# Patient Record
Sex: FEMALE | Race: WHITE | NOT HISPANIC OR LATINO | Employment: OTHER | URBAN - METROPOLITAN AREA
[De-identification: names, ages, dates, MRNs, and addresses within clinical notes are randomized per-mention and may not be internally consistent; named-entity substitution may affect disease eponyms.]

---

## 2018-01-02 ENCOUNTER — APPOINTMENT (OUTPATIENT)
Dept: WOMENS IMAGING | Facility: HOSPITAL | Age: 68
End: 2018-01-02

## 2018-01-02 PROCEDURE — 77067 SCR MAMMO BI INCL CAD: CPT | Performed by: RADIOLOGY

## 2019-01-03 ENCOUNTER — APPOINTMENT (OUTPATIENT)
Dept: WOMENS IMAGING | Facility: HOSPITAL | Age: 69
End: 2019-01-03

## 2019-01-03 PROCEDURE — 77067 SCR MAMMO BI INCL CAD: CPT | Performed by: RADIOLOGY

## 2019-01-03 PROCEDURE — 77063 BREAST TOMOSYNTHESIS BI: CPT | Performed by: RADIOLOGY

## 2019-05-02 ENCOUNTER — TRANSCRIBE ORDERS (OUTPATIENT)
Dept: PHYSICAL THERAPY | Facility: HOSPITAL | Age: 69
End: 2019-05-02

## 2019-05-02 DIAGNOSIS — M75.42 SUBACROMIAL IMPINGEMENT OF LEFT SHOULDER: Primary | ICD-10-CM

## 2019-05-14 ENCOUNTER — PREP FOR SURGERY (OUTPATIENT)
Dept: OTHER | Facility: HOSPITAL | Age: 69
End: 2019-05-14

## 2019-05-14 DIAGNOSIS — Z12.11 SCREEN FOR COLON CANCER: ICD-10-CM

## 2019-05-14 DIAGNOSIS — E10.9 TYPE 1 DIABETES MELLITUS WITHOUT COMPLICATION (HCC): Primary | ICD-10-CM

## 2019-05-21 ENCOUNTER — APPOINTMENT (OUTPATIENT)
Dept: PHYSICAL THERAPY | Facility: HOSPITAL | Age: 69
End: 2019-05-21

## 2019-05-22 ENCOUNTER — HOSPITAL ENCOUNTER (OUTPATIENT)
Dept: PHYSICAL THERAPY | Facility: HOSPITAL | Age: 69
Setting detail: THERAPIES SERIES
Discharge: HOME | End: 2019-05-22

## 2019-05-22 DIAGNOSIS — M75.42 SUBACROMIAL IMPINGEMENT OF LEFT SHOULDER: ICD-10-CM

## 2019-05-22 PROCEDURE — 97110 THERAPEUTIC EXERCISES: CPT | Performed by: PHYSICAL THERAPIST

## 2019-05-22 PROCEDURE — 97161 PT EVAL LOW COMPLEX 20 MIN: CPT | Performed by: PHYSICAL THERAPIST

## 2019-05-23 NOTE — THERAPY EVALUATION
Outpatient Physical Therapy Ortho Initial Evaluation  [facility]     Patient Name: Eveline Fierro  : 1950  MRN: [id number]  Today's Date: 2019      Visit Date: 2019    There is no problem list on file for this patient.       No past medical history on file.     No past surgical history on file.    Visit Dx:     ICD-10-CM ICD-9-CM   1. Subacromial impingement of left shoulder M75.42 726.19         Patient History     Row Name 19 0900             History    Chief Complaint  Difficulty with daily activities;Joint stiffness;Joint swelling;Muscle tenderness;Muscle weakness  -DH      Date Current Problem(s) Began  19  -DH      Brief Description of Current Complaint  pt hs hx of dm/ (R) UE domininat.  had injection (L) shoulder with benefit.  notes diffiuculty with reaching/ lifting tasks.  70 y/o female presenting to PT with hx of (L) shoulder pain  -DH         Pain     Pain Location  -- (L) shoulder pain  -DH      Pain at Present  3  -DH      Is your sleep disturbed?  No  -DH      Difficulties at work?  y  -DH      Difficulties with ADL's?  y  -DH      Difficulties with recreational activities?  y  -DH         Daily Activities    Pt Participated in POC and Goals  Yes  -DH         Safety    Are you being hurt, hit, or frightened by anyone at home or in your life?  No  -DH      Are you being neglected by a caregiver  No  -DH        User Key  (r) = Recorded By, (t) = Taken By, (c) = Cosigned By    Initials Name Provider Type     Juan Carlos Ocampo, PT Physical Therapist          PT Ortho     Row Name 19 0900       Subjective Comments    Subjective Comments  (L) shoulder pain  -DH       Special Tests/Palpation    Special Tests/Palpation  -- ss/ bicipital groove  -DH       General ROM    GENERAL ROM COMMENTS  (L) shoulder AROM 150/ ab 135/ IR 60  -DH       MMT (Manual Muscle Testing)    General MMT Comments  (L) ER 4-/ scaption 4-/5, ab 4-/5  -DH       Sensation    Additional  Comments  (+) hk/ (+) speed (-) obriend  -      User Key  (r) = Recorded By, (t) = Taken By, (c) = Cosigned By    Initials Name Provider Type     Juan Carlos Ocampo, PT Physical Therapist                            PT OP Goals     Row Name 05/23/19 1200          PT Short Term Goals    STG Date to Achieve  06/05/19  -     STG 1  pt to be educated/ independent with initial HEP specific to AAROm/ precautions-restrictions. 2 wks.   -     STG 2  increase (L) shoulder AAROM to 170 degrees in flexion/ abduction to allow for increased ease with dressing/ grooming activities. 2 wks.   -        Long Term Goals    LTG Date to Achieve  06/22/19  -     LTG 1  increased (L) shoulder AROM 85% of (R) to allow for increased ease with reaching/ lifting/ hold ing objeects. 4 wks.   -     LTG 2  increase (L) RC-scapular stabilization MMT at 90 degrees 4/5 to allow for increased ease with reaching/ lifting/ holding objects without exacerbation of pain greater than 3/ 10. 4 wks.   -     LTG 3  pt to report > 40% improvemend on functional outcome score. 4 wks.   -        Time Calculation    PT Goal Re-Cert Due Date  06/22/19  -       User Key  (r) = Recorded By, (t) = Taken By, (c) = Cosigned By    Initials Name Provider Type    Juan Carlos Knox, YVONNE Physical Therapist                Exercises     Row Name 05/23/19 0900             Subjective Comments    Subjective Comments  (L) shoulder pain  -         Total Minutes    93840 - PT Therapeutic Exercise Minutes  20  -DH         Exercise 1    Exercise Name 1  see flow sheet  -        User Key  (r) = Recorded By, (t) = Taken By, (c) = Cosigned By    Initials Name Provider Type    Juan Carlos Knox, PT Physical Therapist             assessment:     Eveline Fierro is a 69 y.o. year-old female referred to physical therapy for (L) shoulder internal impingement. She presents with a stable clinical presentation.  She has DM as a comorbidities  and no personal factors  that  may affect her progress in the plan of care.  Pt reports decrease tolerance with reaching / lifting/ holding objects with (R) UE secondary to 4/ 10 (L) shoulder pain. Signs and symptoms are consistent with physical therapy diagnosis of (L) shoulder internal impingement.           Outcome Measure Options: (38% dash)         Time Calculation:     Start Time: 1515  Stop Time: 1600  Time Calculation (min): 45 min     Therapy Charges for Today     Code Description Service Date Service Provider Modifiers Qty    82690342397 HC PT THER PROC EA 15 MIN 5/22/2019 Juan Carlos Ocampo, PT GP 1    83579470924 HC PT EVAL LOW COMPLEXITY 2 5/22/2019 Juan Carlos Ocampo, PT GP 1          PT G-Codes  Outcome Measure Options: (38% dash)         Juan Carlos Ocampo, PT  5/23/2019

## 2019-05-28 ENCOUNTER — HOSPITAL ENCOUNTER (OUTPATIENT)
Dept: PHYSICAL THERAPY | Facility: HOSPITAL | Age: 69
Setting detail: THERAPIES SERIES
Discharge: HOME | End: 2019-05-28

## 2019-05-28 DIAGNOSIS — M75.42 SUBACROMIAL IMPINGEMENT OF LEFT SHOULDER: Primary | ICD-10-CM

## 2019-05-28 PROBLEM — Z12.11 SCREEN FOR COLON CANCER: Status: ACTIVE | Noted: 2019-05-28

## 2019-05-28 PROCEDURE — 97110 THERAPEUTIC EXERCISES: CPT | Performed by: PHYSICAL THERAPIST

## 2019-05-29 NOTE — THERAPY TREATMENT NOTE
Outpatient Physical Therapy Ortho Treatment Note  [facility]     Patient Name: Eveline Fierro  : 1950  MRN: [id number]  Today's Date: 2019      Visit Date: 2019    Visit Dx:    ICD-10-CM ICD-9-CM   1. Subacromial impingement of left shoulder M75.42 726.19       Patient Active Problem List   Diagnosis   • Screen for colon cancer        No past medical history on file.     No past surgical history on file.                          Exercises     Row Name 19 0700             Subjective Comments    Subjective Comments  pulleys are helping me  -DH         Total Minutes    22066 - PT Therapeutic Exercise Minutes  45  -DH         Exercise 1    Exercise Name 1  see flow sheet  -        User Key  (r) = Recorded By, (t) = Taken By, (c) = Cosigned By    Initials Name Provider Type     Juan Carlos Ocampo, PT Physical Therapist       assessment:           progressed with shoulder PROM/ AAROM/ AROM per tolerance.  implemented mild rc-scapular stabilization per tolerance.   no pain with PRE;  improved IR to L3 region                            Time Calculation:   Start Time: 1345  Stop Time: 1430  Time Calculation (min): 45 min  Therapy Charges for Today     Code Description Service Date Service Provider Modifiers Qty    94224901469 HC PT THER PROC EA 15 MIN 2019 Juan Carlos Ocampo PT GP 3                    Juan Carlos Ocampo PT  2019

## 2019-06-04 ENCOUNTER — HOSPITAL ENCOUNTER (OUTPATIENT)
Dept: PHYSICAL THERAPY | Facility: HOSPITAL | Age: 69
Setting detail: THERAPIES SERIES
Discharge: HOME | End: 2019-06-04

## 2019-06-04 DIAGNOSIS — M75.42 SUBACROMIAL IMPINGEMENT OF LEFT SHOULDER: Primary | ICD-10-CM

## 2019-06-04 PROCEDURE — 97110 THERAPEUTIC EXERCISES: CPT | Performed by: PHYSICAL THERAPIST

## 2019-06-05 NOTE — THERAPY TREATMENT NOTE
Outpatient Physical Therapy Ortho Treatment Note  [facility]     Patient Name: Eveline Fierro  : 1950  MRN: [id number]  Today's Date: 2019      Visit Date: 2019    Visit Dx:    ICD-10-CM ICD-9-CM   1. Subacromial impingement of left shoulder M75.42 726.19       Patient Active Problem List   Diagnosis   • Screen for colon cancer        No past medical history on file.     No past surgical history on file.                          Exercises     Row Name 19 1200             Subjective Comments    Subjective Comments  IR is better  -         Total Minutes    15191 - PT Therapeutic Exercise Minutes  45  -         Exercise 1    Exercise Name 1  PT empahsis on shoulder PROM/ AAROM;  progressed with RC-scapular stabilization  -        User Key  (r) = Recorded By, (t) = Taken By, (c) = Cosigned By    Initials Name Provider Type     Juan Carlos Ocampo PT Physical Therapist              assessment:        progressed with shoulder AAROM/ AROM; improved IR to bra strap.   progressed with RC-scapular stabilization to fatigue without symptoms of pain.  encouraged with progress.                        Time Calculation:   Start Time: 1345  Stop Time: 1430  Time Calculation (min): 45 min  Therapy Charges for Today     Code Description Service Date Service Provider Modifiers Qty    98450225358 HC PT THER PROC EA 15 MIN 2019 Juan Carlos Ocampo PT GP 3                    Juan Carlos Ocampo PT  2019

## 2019-06-11 ENCOUNTER — HOSPITAL ENCOUNTER (OUTPATIENT)
Dept: PHYSICAL THERAPY | Facility: HOSPITAL | Age: 69
Setting detail: THERAPIES SERIES
Discharge: HOME | End: 2019-06-11

## 2019-06-11 DIAGNOSIS — M75.42 SUBACROMIAL IMPINGEMENT OF LEFT SHOULDER: Primary | ICD-10-CM

## 2019-06-11 PROCEDURE — 97110 THERAPEUTIC EXERCISES: CPT | Performed by: PHYSICAL THERAPIST

## 2019-06-11 NOTE — THERAPY TREATMENT NOTE
Outpatient Physical Therapy Ortho Treatment Note  [facility]     Patient Name: Eveline Fierro  : 1950  MRN: [id number]  Today's Date: 2019      Visit Date: 2019    Visit Dx:    ICD-10-CM ICD-9-CM   1. Subacromial impingement of left shoulder M75.42 726.19       Patient Active Problem List   Diagnosis   • Screen for colon cancer        No past medical history on file.     No past surgical history on file.                    PT Assessment/Plan     Row Name 19 1720          PT Assessment    Assessment Comments  Pt is fairly independent with current program and did not require much assist with program.  She was frustrated at change in schedule but worked hard with current program and new exercises.   -DM       User Key  (r) = Recorded By, (t) = Taken By, (c) = Cosigned By    Initials Name Provider Type    Michelel Cameron, PT Physical Therapist            Exercises     Row Name 19 1700             Subjective Comments    Subjective Comments  Working hard on all exercises and feels she is pretty independent with most exercises.    -DM         Subjective Pain    Able to rate subjective pain?  yes  -DM      Pre-Treatment Pain Level  1  -DM         Total Minutes    56587 - PT Therapeutic Exercise Minutes  20  -DM         Exercise 1    Exercise Name 1  Bent over row with 5#  -DM      Cueing 1  Verbal;Tactile  -DM      Reps 1  3x10  -DM      Additional Comments  VC for scapular retraction  -DM         Exercise 2    Exercise Name 2  Elbow lift  -DM      Cueing 2  Verbal;Tactile  -DM      Reps 2  25  -DM      Additional Comments  VC/TC for scapular retraction and depressionn  -DM         Exercise 3    Exercise Name 3  Scapular clock with purple band around back pushing to 3,6,7,9,  -DM      Cueing 3  Verbal;Tactile  -DM      Reps 3  2x 10  -DM         Exercise 4    Exercise Name 4  Standing row with purple band  -DM      Cueing 4  Verbal;Tactile;Demo  -DM      Reps 4  2x10  -DM       Additional Comments  VC/TC for scapular retraction and depressionn  -DM         Exercise 5    Exercise Name 5  Standing UE extension +retraction with purple band  -DM      Cueing 5  Verbal;Tactile;Demo  -DM      Reps 5  2x10  -DM      Additional Comments  VC/TC for scapular retraction and depressionn  -DM         Exercise 6    Exercise Name 6  Educated patient on impingement and impact of posture and mechanics to avoid impingement of shoulder.   -DM        User Key  (r) = Recorded By, (t) = Taken By, (c) = Cosigned By    Initials Name Provider Type    DM Michelle Causey, PT Physical Therapist                                          Time Calculation:   Start Time: 1440  Stop Time: 1505  Time Calculation (min): 25 min  Therapy Charges for Today     Code Description Service Date Service Provider Modifiers Qty    82012810743  PT THER PROC EA 15 MIN 6/11/2019 Michelle Causey, PT GP 1                    Michelle Causey, PT  6/11/2019

## 2019-06-18 ENCOUNTER — HOSPITAL ENCOUNTER (OUTPATIENT)
Dept: PHYSICAL THERAPY | Facility: HOSPITAL | Age: 69
Setting detail: THERAPIES SERIES
Discharge: HOME | End: 2019-06-18

## 2019-06-18 DIAGNOSIS — M75.42 SUBACROMIAL IMPINGEMENT OF LEFT SHOULDER: Primary | ICD-10-CM

## 2019-06-18 PROCEDURE — 97110 THERAPEUTIC EXERCISES: CPT | Performed by: PHYSICAL THERAPIST

## 2019-07-02 ENCOUNTER — HOSPITAL ENCOUNTER (OUTPATIENT)
Dept: PHYSICAL THERAPY | Facility: HOSPITAL | Age: 69
Setting detail: THERAPIES SERIES
Discharge: HOME | End: 2019-07-02

## 2019-07-02 DIAGNOSIS — M75.42 SUBACROMIAL IMPINGEMENT OF LEFT SHOULDER: Primary | ICD-10-CM

## 2019-07-02 PROCEDURE — 97110 THERAPEUTIC EXERCISES: CPT | Performed by: PHYSICAL THERAPIST

## 2019-07-02 NOTE — THERAPY TREATMENT NOTE
Outpatient Physical Therapy Ortho Treatment Note  [facility]     Patient Name: Eveline Fierro  : 1950  MRN: [id number]  Today's Date: 2019      Visit Date: 2019    Visit Dx:    ICD-10-CM ICD-9-CM   1. Subacromial impingement of left shoulder M75.42 726.19       Patient Active Problem List   Diagnosis   • Screen for colon cancer        No past medical history on file.     No past surgical history on file.                    PT Assessment/Plan     Row Name 19 1428          PT Assessment    Assessment Comments  progressed IR AAROM/ AROM;  implemented rc-scapular stabilization below 90 degrees;  no pain with pre;  imrpved IR to T8.   -DH     Please refer to paper survey for additional self-reported information  Yes  -DH     Rehab Potential  Good  -DH       User Key  (r) = Recorded By, (t) = Taken By, (c) = Cosigned By    Initials Name Provider Type     Juan Carlos Ocampo, PT Physical Therapist            Exercises     Row Name 19 1400             Subjective Comments    Subjective Comments  shoulder 70% improved; no pain with reaching/ discomfort with IR  -DH         Total Minutes    73663 - PT Therapeutic Exercise Minutes  40  -DH         Exercise 1    Exercise Name 1  reviewed progressed with IR AAROM/ AROM; rc scapular stabilization  -DH        User Key  (r) = Recorded By, (t) = Taken By, (c) = Cosigned By    Initials Name Provider Type    Juan Carlos Knox, PT Physical Therapist                                          Time Calculation:   Start Time: 1345  Stop Time: 1425  Time Calculation (min): 40 min  Therapy Charges for Today     Code Description Service Date Service Provider Modifiers Qty    28754517591  PT THER PROC EA 15 MIN 2019 Juan Carlos Ocampo, PT GP 3                    Juan Carlos Ocampo, PT  2019

## 2019-07-09 ENCOUNTER — HOSPITAL ENCOUNTER (OUTPATIENT)
Dept: PHYSICAL THERAPY | Facility: HOSPITAL | Age: 69
Setting detail: THERAPIES SERIES
Discharge: HOME | End: 2019-07-09

## 2019-07-09 DIAGNOSIS — M75.42 SUBACROMIAL IMPINGEMENT OF LEFT SHOULDER: Primary | ICD-10-CM

## 2019-07-09 PROCEDURE — 97110 THERAPEUTIC EXERCISES: CPT | Performed by: PHYSICAL THERAPIST

## 2019-07-10 NOTE — THERAPY TREATMENT NOTE
Outpatient Physical Therapy Ortho Treatment Note  [facility]     Patient Name: Eveline Fierro  : 1950  MRN: [id number]  Today's Date: 7/10/2019      Visit Date: 2019    Visit Dx:    ICD-10-CM ICD-9-CM   1. Subacromial impingement of left shoulder M75.42 726.19       Patient Active Problem List   Diagnosis   • Screen for colon cancer        No past medical history on file.     No past surgical history on file.                          Exercises     Row Name 07/10/19 0800             Subjective Comments    Subjective Comments  shoulder is doing well.  mild stiffness with IR  -DH         Total Minutes    04040 - PT Therapeutic Exercise Minutes  45  -         Exercise 1    Exercise Name 1  reviewed HEP/ mild cuing/ issued progressed HEP  -        User Key  (r) = Recorded By, (t) = Taken By, (c) = Cosigned By    Initials Name Provider Type    Juan Carlos Knox, PT Physical Therapist             pt has met all goals. demonstrates full shoulder AROM throughout;  mild discomfort with IR 85 degrees; increased RC-scapular stabilization 4+/5;  independent with progressed HEP.   appropriate for discharge at this time. pt agrees with POC and is satisfied with all intervention provided.            PT OP Goals     Row Name 07/10/19 0800          PT Short Term Goals    STG Date to Achieve  19  -     STG 1  pt to be educated/ independent with initial HEP specific to AAROm/ precautions-restrictions. 2 wks.   -     STG 1 Progress  Met  -     STG 2  increase (L) shoulder AAROM to 170 degrees in flexion/ abduction to allow for increased ease with dressing/ grooming activities. 2 wks.   -     STG 2 Progress  Met  [facility]        Long Term Goals    LTG Date to Achieve  19  -     LTG 1  increased (L) shoulder AROM 85% of (R) to allow for increased ease with reaching/ lifting/ hold ing objeects. 4 wks.   -     LTG 1 Progress  Met  [facility]     LTG 2  increase (L) RC-scapular stabilization MMT at 90  degrees 4/5 to allow for increased ease with reaching/ lifting/ holding objects without exacerbation of pain greater than 3/ 10. 4 wks.   -     LTG 2 Progress  Met  -       User Key  (r) = Recorded By, (t) = Taken By, (c) = Cosigned By    Initials Name Provider Type     Juan Carlos Ocampo, PT Physical Therapist                         Time Calculation:   Start Time: 1345  Stop Time: 1430  Time Calculation (min): 45 min  Therapy Charges for Today     Code Description Service Date Service Provider Modifiers Qty    78557742913  PT THER PROC EA 15 MIN 7/9/2019 Juan Carlos Ocampo, PT GP 3                    Juan Carlos Ocampo, PT  7/10/2019

## 2019-07-10 NOTE — THERAPY DISCHARGE NOTE
Outpatient Physical Therapy Ortho Progress Note/Discharge Summary  [facility]     Patient Name: Eveline Fierro  : 1950  MRN: [id number]  Today's Date: 7/10/2019      Visit Date: 2019    Visit Dx:    ICD-10-CM ICD-9-CM   1. Subacromial impingement of left shoulder M75.42 726.19       Patient Active Problem List   Diagnosis   • Screen for colon cancer        No past medical history on file.     No past surgical history on file.                            Exercises     Row Name 07/10/19 0800             Subjective Comments    Subjective Comments  shoulder is doing well.  mild stiffness with IR  -DH         Total Minutes    60826 - PT Therapeutic Exercise Minutes  45  -         Exercise 1    Exercise Name 1  reviewed HEP/ mild cuing/ issued progressed HEP  -        User Key  (r) = Recorded By, (t) = Taken By, (c) = Cosigned By    Initials Name Provider Type     Juan Carlos Ocampo, PT Physical Therapist                         PT OP Goals     Row Name 07/10/19 0800          PT Short Term Goals    STG Date to Achieve  19  -     STG 1  pt to be educated/ independent with initial HEP specific to AAROm/ precautions-restrictions. 2 wks.   -     STG 1 Progress  Met  -     STG 2  increase (L) shoulder AAROM to 170 degrees in flexion/ abduction to allow for increased ease with dressing/ grooming activities. 2 wks.   -     STG 2 Progress  Met  [facility]        Long Term Goals    LTG Date to Achieve  19  -     LTG 1  increased (L) shoulder AROM 85% of (R) to allow for increased ease with reaching/ lifting/ hold ing objeects. 4 wks.   -     LTG 1 Progress  Met  -     LTG 2  increase (L) RC-scapular stabilization MMT at 90 degrees 4/5 to allow for increased ease with reaching/ lifting/ holding objects without exacerbation of pain greater than 3/ 10. 4 wks.   -     LTG 2 Progress  Met  -       User Key  (r) = Recorded By, (t) = Taken By, (c) = Cosigned By    Initials Name Provider  Type    DH Juan Carlos Ocampo, PT Physical Therapist                         Time Calculation:   Start Time: 1345  Stop Time: 1430  Time Calculation (min): 45 min  Therapy Charges for Today     Code Description Service Date Service Provider Modifiers Qty    70481022952 HC PT THER PROC EA 15 MIN 7/9/2019 Juan Carlos Ocampo, PT GP 3                       Juan Carlos Ocampo, PT  7/10/2019

## 2019-07-29 ENCOUNTER — ANESTHESIA (OUTPATIENT)
Dept: GASTROENTEROLOGY | Facility: HOSPITAL | Age: 69
End: 2019-07-29

## 2019-07-29 ENCOUNTER — HOSPITAL ENCOUNTER (OUTPATIENT)
Facility: HOSPITAL | Age: 69
Setting detail: HOSPITAL OUTPATIENT SURGERY
Discharge: HOME | End: 2019-07-29
Attending: SURGERY | Admitting: SURGERY

## 2019-07-29 ENCOUNTER — ANESTHESIA EVENT (OUTPATIENT)
Dept: GASTROENTEROLOGY | Facility: HOSPITAL | Age: 69
End: 2019-07-29

## 2019-07-29 VITALS
OXYGEN SATURATION: 100 % | DIASTOLIC BLOOD PRESSURE: 63 MMHG | BODY MASS INDEX: 27.34 KG/M2 | SYSTOLIC BLOOD PRESSURE: 136 MMHG | RESPIRATION RATE: 16 BRPM | HEIGHT: 65 IN | TEMPERATURE: 98.1 F | HEART RATE: 45 BPM | WEIGHT: 164.1 LBS

## 2019-07-29 LAB — GLUCOSE BLDC GLUCOMTR-MCNC: 143 MG/DL (ref 70–130)

## 2019-07-29 PROCEDURE — G0121 COLON CA SCRN NOT HI RSK IND: HCPCS | Performed by: SURGERY

## 2019-07-29 PROCEDURE — 25010000002 PROPOFOL 10 MG/ML EMULSION: Performed by: ANESTHESIOLOGY

## 2019-07-29 PROCEDURE — 82962 GLUCOSE BLOOD TEST: CPT

## 2019-07-29 PROCEDURE — 25010000002 GLUCAGON (RDNA) PER 1 MG: Performed by: SURGERY

## 2019-07-29 RX ORDER — UBIDECARENONE 100 MG
100 CAPSULE ORAL DAILY
COMMUNITY

## 2019-07-29 RX ORDER — LIDOCAINE HYDROCHLORIDE 20 MG/ML
INJECTION, SOLUTION INFILTRATION; PERINEURAL AS NEEDED
Status: DISCONTINUED | OUTPATIENT
Start: 2019-07-29 | End: 2019-07-29 | Stop reason: SURG

## 2019-07-29 RX ORDER — PROPOFOL 10 MG/ML
VIAL (ML) INTRAVENOUS AS NEEDED
Status: DISCONTINUED | OUTPATIENT
Start: 2019-07-29 | End: 2019-07-29 | Stop reason: SURG

## 2019-07-29 RX ORDER — SODIUM CHLORIDE, SODIUM LACTATE, POTASSIUM CHLORIDE, CALCIUM CHLORIDE 600; 310; 30; 20 MG/100ML; MG/100ML; MG/100ML; MG/100ML
30 INJECTION, SOLUTION INTRAVENOUS CONTINUOUS PRN
Status: DISCONTINUED | OUTPATIENT
Start: 2019-07-29 | End: 2019-07-29 | Stop reason: HOSPADM

## 2019-07-29 RX ORDER — LOSARTAN POTASSIUM 25 MG/1
25 TABLET ORAL DAILY
COMMUNITY

## 2019-07-29 RX ORDER — LANOLIN ALCOHOL/MO/W.PET/CERES
1000 CREAM (GRAM) TOPICAL DAILY
COMMUNITY

## 2019-07-29 RX ORDER — PRAVASTATIN SODIUM 10 MG
10 TABLET ORAL DAILY
COMMUNITY

## 2019-07-29 RX ORDER — LEVOTHYROXINE SODIUM 88 UG/1
88 TABLET ORAL DAILY
COMMUNITY

## 2019-07-29 RX ORDER — INSULIN GLARGINE 100 [IU]/ML
17 INJECTION, SOLUTION SUBCUTANEOUS DAILY
COMMUNITY

## 2019-07-29 RX ADMIN — LIDOCAINE HYDROCHLORIDE 75 MG: 20 INJECTION, SOLUTION INFILTRATION; PERINEURAL at 09:22

## 2019-07-29 RX ADMIN — SODIUM CHLORIDE, POTASSIUM CHLORIDE, SODIUM LACTATE AND CALCIUM CHLORIDE 30 ML/HR: 600; 310; 30; 20 INJECTION, SOLUTION INTRAVENOUS at 08:19

## 2019-07-29 RX ADMIN — PROPOFOL 150 MG: 10 INJECTION, EMULSION INTRAVENOUS at 09:22

## 2019-07-29 NOTE — ANESTHESIA POSTPROCEDURE EVALUATION
"Patient: Eveline Fierro    Procedure Summary     Date:  07/29/19 Room / Location:  [facility] ENDOSCOPY 4 / [facility] ENDOSCOPY    Anesthesia Start:  0922 Anesthesia Stop:  0941    Procedure:  COLONOSCOPY TO CECUM (N/A ) Diagnosis:       Screen for colon cancer      (Screen for colon cancer [Z12.11])    Surgeon:  Carmela Ng MD Provider:  Celia Jones MD    Anesthesia Type:  MAC ASA Status:  2          Anesthesia Type: MAC  Last vitals  BP   131/65 (07/29/19 0941)   Temp   36.7 °C (98.1 °F) (07/29/19 0802)   Pulse   60 (07/29/19 0941)   Resp   16 (07/29/19 0941)     SpO2   100 % (07/29/19 0941)     Post Anesthesia Care and Evaluation    Patient location during evaluation: PACU  Patient participation: complete - patient participated  Level of consciousness: sleepy but conscious  Pain score: 0  Pain management: adequate  Airway patency: patent  Anesthetic complications: No anesthetic complications    Cardiovascular status: acceptable  Respiratory status: acceptable  Hydration status: acceptable    Comments: Blood pressure 131/65, pulse 60, temperature 36.7 °C (98.1 °F), temperature source Oral, resp. rate 16, height 164.6 cm (64.8\"), weight 74.4 kg (164 lb 1.6 oz), SpO2 100 %.    No anesthesia care post op    "

## 2019-07-29 NOTE — ANESTHESIA PREPROCEDURE EVALUATION
Anesthesia Evaluation     Patient summary reviewed and Nursing notes reviewed   history of anesthetic complications: PONV  NPO Solid Status: > 8 hours  NPO Liquid Status: > 4 hours           Airway   Dental      Pulmonary    Cardiovascular     (+) hypertension less than 2 medications,       Neuro/Psych  GI/Hepatic/Renal/Endo    (+)   diabetes mellitus,     Musculoskeletal     Abdominal    Substance History      OB/GYN          Other                        Anesthesia Plan    ASA 2     MAC     Anesthetic plan, all risks, benefits, and alternatives have been provided, discussed and informed consent has been obtained with: patient.

## 2020-01-20 ENCOUNTER — APPOINTMENT (OUTPATIENT)
Dept: WOMENS IMAGING | Facility: HOSPITAL | Age: 70
End: 2020-01-20

## 2020-01-20 PROCEDURE — 77067 SCR MAMMO BI INCL CAD: CPT | Performed by: RADIOLOGY

## 2020-01-20 PROCEDURE — 77063 BREAST TOMOSYNTHESIS BI: CPT | Performed by: RADIOLOGY

## 2021-06-08 ENCOUNTER — APPOINTMENT (OUTPATIENT)
Dept: WOMENS IMAGING | Facility: HOSPITAL | Age: 71
End: 2021-06-08

## 2021-06-08 PROCEDURE — 77067 SCR MAMMO BI INCL CAD: CPT | Performed by: RADIOLOGY

## 2021-06-08 PROCEDURE — 77063 BREAST TOMOSYNTHESIS BI: CPT | Performed by: RADIOLOGY

## 2022-06-10 ENCOUNTER — APPOINTMENT (OUTPATIENT)
Dept: WOMENS IMAGING | Facility: HOSPITAL | Age: 72
End: 2022-06-10

## 2022-06-10 PROCEDURE — 77067 SCR MAMMO BI INCL CAD: CPT | Performed by: RADIOLOGY

## 2022-06-10 PROCEDURE — 77063 BREAST TOMOSYNTHESIS BI: CPT | Performed by: RADIOLOGY

## 2022-08-02 ENCOUNTER — TRANSCRIBE ORDERS (OUTPATIENT)
Dept: ADMINISTRATIVE | Facility: HOSPITAL | Age: 72
End: 2022-08-02

## 2022-08-02 DIAGNOSIS — Z13.6 ENCOUNTER FOR SCREENING FOR VASCULAR DISEASE: Primary | ICD-10-CM

## 2022-12-02 ENCOUNTER — HOSPITAL ENCOUNTER (OUTPATIENT)
Dept: CARDIOLOGY | Facility: HOSPITAL | Age: 72
Discharge: HOME | End: 2022-12-02
Admitting: INTERNAL MEDICINE

## 2022-12-02 VITALS
HEIGHT: 63 IN | WEIGHT: 176 LBS | BODY MASS INDEX: 31.18 KG/M2 | HEART RATE: 52 BPM | DIASTOLIC BLOOD PRESSURE: 61 MMHG | SYSTOLIC BLOOD PRESSURE: 110 MMHG

## 2022-12-02 DIAGNOSIS — Z13.6 ENCOUNTER FOR SCREENING FOR VASCULAR DISEASE: ICD-10-CM

## 2022-12-02 LAB
BH CV ECHO MEAS - DIST AO DIAM: 1.49 CM
BH CV VAS BP LEFT ARM: NORMAL MMHG
BH CV VAS BP RIGHT ARM: NORMAL MMHG
BH CV XLRA MEAS - MID AO DIAM: 1.76 CM
BH CV XLRA MEAS - PAD LEFT ABI DP: 1.33
BH CV XLRA MEAS - PAD LEFT ABI PT: 1.37
BH CV XLRA MEAS - PAD LEFT ARM: 102 MMHG
BH CV XLRA MEAS - PAD LEFT LEG DP: 146 MMHG
BH CV XLRA MEAS - PAD LEFT LEG PT: 151 MMHG
BH CV XLRA MEAS - PAD RIGHT ABI DP: 1.36
BH CV XLRA MEAS - PAD RIGHT ABI PT: 1.38
BH CV XLRA MEAS - PAD RIGHT ARM: 110 MMHG
BH CV XLRA MEAS - PAD RIGHT LEG DP: 150 MMHG
BH CV XLRA MEAS - PAD RIGHT LEG PT: 152 MMHG
BH CV XLRA MEAS - PROX AO DIAM: 2.01 CM
BH CV XLRA MEAS LEFT ICA/CCA RATIO: 0.98
BH CV XLRA MEAS LEFT MID CCA PSV: NORMAL CM/SEC
BH CV XLRA MEAS LEFT MID ICA PSV: NORMAL CM/SEC
BH CV XLRA MEAS LEFT PROX ECA PSV: NORMAL CM/SEC
BH CV XLRA MEAS RIGHT ICA/CCA RATIO: 1.05
BH CV XLRA MEAS RIGHT MID CCA PSV: NORMAL CM/SEC
BH CV XLRA MEAS RIGHT MID ICA PSV: NORMAL CM/SEC
BH CV XLRA MEAS RIGHT PROX ECA PSV: NORMAL CM/SEC
MAXIMAL PREDICTED HEART RATE: 150 BPM
STRESS TARGET HR: 128 BPM

## 2022-12-02 PROCEDURE — 93799 UNLISTED CV SVC/PROCEDURE: CPT

## 2023-10-31 ENCOUNTER — LAB (OUTPATIENT)
Dept: LAB | Facility: HOSPITAL | Age: 73
End: 2023-10-31
Payer: MEDICARE

## 2023-10-31 ENCOUNTER — TRANSCRIBE ORDERS (OUTPATIENT)
Dept: CARDIOLOGY | Facility: HOSPITAL | Age: 73
End: 2023-10-31
Payer: MEDICARE

## 2023-10-31 ENCOUNTER — HOSPITAL ENCOUNTER (OUTPATIENT)
Dept: CARDIOLOGY | Facility: HOSPITAL | Age: 73
Discharge: HOME | End: 2023-10-31
Payer: MEDICARE

## 2023-10-31 ENCOUNTER — TRANSCRIBE ORDERS (OUTPATIENT)
Dept: LAB | Facility: HOSPITAL | Age: 73
End: 2023-10-31
Payer: MEDICARE

## 2023-10-31 DIAGNOSIS — N20.0 URIC ACID NEPHROLITHIASIS: ICD-10-CM

## 2023-10-31 DIAGNOSIS — Z01.811 PRE-OP CHEST EXAM: Primary | ICD-10-CM

## 2023-10-31 DIAGNOSIS — N20.0 URIC ACID NEPHROLITHIASIS: Primary | ICD-10-CM

## 2023-10-31 LAB
ANION GAP SERPL CALCULATED.3IONS-SCNC: 9 MMOL/L (ref 5–15)
BASOPHILS # BLD AUTO: 0.02 10*3/MM3 (ref 0–0.2)
BASOPHILS NFR BLD AUTO: 0.3 % (ref 0–1.5)
BUN SERPL-MCNC: 18 MG/DL (ref 8–23)
BUN/CREAT SERPL: 20.5 (ref 7–25)
CALCIUM SPEC-SCNC: 9.5 MG/DL (ref 8.6–10.5)
CHLORIDE SERPL-SCNC: 108 MMOL/L (ref 98–107)
CO2 SERPL-SCNC: 29 MMOL/L (ref 22–29)
CREAT SERPL-MCNC: 0.88 MG/DL (ref 0.57–1)
DEPRECATED RDW RBC AUTO: 41.2 FL (ref 37–54)
EGFRCR SERPLBLD CKD-EPI 2021: 70.4 ML/MIN/1.73
EOSINOPHIL # BLD AUTO: 0.13 10*3/MM3 (ref 0–0.4)
EOSINOPHIL NFR BLD AUTO: 1.8 % (ref 0.3–6.2)
ERYTHROCYTE [DISTWIDTH] IN BLOOD BY AUTOMATED COUNT: 12.8 % (ref 12.3–15.4)
GLUCOSE SERPL-MCNC: 56 MG/DL (ref 65–99)
HCT VFR BLD AUTO: 38.9 % (ref 34–46.6)
HGB BLD-MCNC: 13 G/DL (ref 12–15.9)
IMM GRANULOCYTES # BLD AUTO: 0.02 10*3/MM3 (ref 0–0.05)
IMM GRANULOCYTES NFR BLD AUTO: 0.3 % (ref 0–0.5)
LYMPHOCYTES # BLD AUTO: 1.87 10*3/MM3 (ref 0.7–3.1)
LYMPHOCYTES NFR BLD AUTO: 25.8 % (ref 19.6–45.3)
MCH RBC QN AUTO: 29.7 PG (ref 26.6–33)
MCHC RBC AUTO-ENTMCNC: 33.4 G/DL (ref 31.5–35.7)
MCV RBC AUTO: 88.8 FL (ref 79–97)
MONOCYTES # BLD AUTO: 0.51 10*3/MM3 (ref 0.1–0.9)
MONOCYTES NFR BLD AUTO: 7 % (ref 5–12)
NEUTROPHILS NFR BLD AUTO: 4.71 10*3/MM3 (ref 1.7–7)
NEUTROPHILS NFR BLD AUTO: 64.8 % (ref 42.7–76)
NRBC BLD AUTO-RTO: 0 /100 WBC (ref 0–0.2)
PLATELET # BLD AUTO: 222 10*3/MM3 (ref 140–450)
PMV BLD AUTO: 10.2 FL (ref 6–12)
POTASSIUM SERPL-SCNC: 4 MMOL/L (ref 3.5–5.2)
QT INTERVAL: 424 MS
QTC INTERVAL: 383 MS
RBC # BLD AUTO: 4.38 10*6/MM3 (ref 3.77–5.28)
SODIUM SERPL-SCNC: 146 MMOL/L (ref 136–145)
WBC NRBC COR # BLD: 7.26 10*3/MM3 (ref 3.4–10.8)

## 2023-10-31 PROCEDURE — 85025 COMPLETE CBC W/AUTO DIFF WBC: CPT

## 2023-10-31 PROCEDURE — 36415 COLL VENOUS BLD VENIPUNCTURE: CPT

## 2023-10-31 PROCEDURE — 93005 ELECTROCARDIOGRAM TRACING: CPT

## 2023-10-31 PROCEDURE — 80048 BASIC METABOLIC PNL TOTAL CA: CPT

## 2024-09-25 ENCOUNTER — TRANSCRIBE ORDERS (OUTPATIENT)
Dept: ADMINISTRATIVE | Facility: HOSPITAL | Age: 74
End: 2024-09-25
Payer: MEDICARE

## 2024-09-25 DIAGNOSIS — Z13.6 SCREENING FOR ISCHEMIC HEART DISEASE: Primary | ICD-10-CM

## 2024-10-07 ENCOUNTER — HOSPITAL ENCOUNTER (OUTPATIENT)
Dept: CT IMAGING | Facility: HOSPITAL | Age: 74
Discharge: HOME | End: 2024-10-07
Admitting: INTERNAL MEDICINE

## 2024-10-07 DIAGNOSIS — Z13.6 SCREENING FOR ISCHEMIC HEART DISEASE: ICD-10-CM

## 2024-10-07 PROCEDURE — 75571 CT HRT W/O DYE W/CA TEST: CPT

## 2025-02-10 ENCOUNTER — OFFICE VISIT (OUTPATIENT)
Dept: ENDOCRINOLOGY | Age: 75
End: 2025-02-10
Payer: MEDICARE

## 2025-02-10 VITALS
DIASTOLIC BLOOD PRESSURE: 72 MMHG | TEMPERATURE: 98.2 F | SYSTOLIC BLOOD PRESSURE: 124 MMHG | BODY MASS INDEX: 30.04 KG/M2 | OXYGEN SATURATION: 99 % | WEIGHT: 169.6 LBS | HEART RATE: 53 BPM

## 2025-02-10 DIAGNOSIS — Z79.4 TYPE 2 DIABETES MELLITUS WITHOUT COMPLICATION, WITH LONG-TERM CURRENT USE OF INSULIN: Primary | ICD-10-CM

## 2025-02-10 DIAGNOSIS — E11.9 TYPE 2 DIABETES MELLITUS WITHOUT COMPLICATION, WITH LONG-TERM CURRENT USE OF INSULIN: ICD-10-CM

## 2025-02-10 DIAGNOSIS — E11.9 TYPE 2 DIABETES MELLITUS WITHOUT COMPLICATION, WITH LONG-TERM CURRENT USE OF INSULIN: Primary | ICD-10-CM

## 2025-02-10 DIAGNOSIS — Z79.4 TYPE 2 DIABETES MELLITUS WITHOUT COMPLICATION, WITH LONG-TERM CURRENT USE OF INSULIN: ICD-10-CM

## 2025-02-10 PROCEDURE — 95251 CONT GLUC MNTR ANALYSIS I&R: CPT | Performed by: INTERNAL MEDICINE

## 2025-02-10 PROCEDURE — 99204 OFFICE O/P NEW MOD 45 MIN: CPT | Performed by: INTERNAL MEDICINE

## 2025-02-10 RX ORDER — ACYCLOVIR 400 MG/1
1 TABLET ORAL
Qty: 3 EACH | Refills: 2 | Status: SHIPPED | OUTPATIENT
Start: 2025-02-10

## 2025-02-10 RX ORDER — ACYCLOVIR 400 MG/1
TABLET ORAL
COMMUNITY
Start: 2025-01-21 | End: 2025-02-10 | Stop reason: SDUPTHER

## 2025-02-10 RX ORDER — PEN NEEDLE, DIABETIC 32GX 5/32"
NEEDLE, DISPOSABLE MISCELLANEOUS
COMMUNITY
Start: 2025-01-19

## 2025-02-10 RX ORDER — LANCETS
EACH MISCELLANEOUS
COMMUNITY
Start: 2025-01-29

## 2025-02-10 RX ORDER — BLOOD SUGAR DIAGNOSTIC
STRIP MISCELLANEOUS
COMMUNITY
Start: 2025-01-20

## 2025-02-10 NOTE — PROGRESS NOTES
Chief complaint   Chief Complaint   Patient presents with     Type 1 diabetes mellitus without complications          Subjective     History of Present Illness:          This is a 74 year old female I am seeing for type 2 DM   referred by PCP     other medical history is   1- HTN   2- HLD   3- Hypothyroidism   4- Other   Pt had T2DM for years now about 10 years   Current home medication for diabetes     Lantus, pen 15 units PM   Humalog 6 , 10, 12 units per day   Was on pills in the past     the A1c was 6.7 in 1-2025  Checks blood sugar at home using CGM   Checks blood sugar 1-4 times per day   SBMG: ave is 140 with 12 % low and 64 % in range   pt states that he is having better BG now , trying his best with dietary Compliance, in regards to Exercise,  on a daily basis and his Weight has been the same and there is few  Hypoglycemic episodes,mainly at night  there is no AM Headaches /Nightmares, no Polyuria / Polydipsia, and there os no Blurring of Vision  Last Dilated Pupil Exam, in 2023 , no DM in the eye   NoTingling / numbness in feet, No Dizziness / lightheadedness, No Falls  No Nausea, vomiting / Diarrhea  retired.       Latest Reference Range & Units 10/31/23 13:12   Sodium 136 - 145 mmol/L 146 (H)   Potassium 3.5 - 5.2 mmol/L 4.0   Chloride 98 - 107 mmol/L 108 (H)   CO2 22.0 - 29.0 mmol/L 29.0   Anion Gap 5.0 - 15.0 mmol/L 9.0   BUN 8 - 23 mg/dL 18   Creatinine 0.57 - 1.00 mg/dL 0.88   BUN/Creatinine Ratio 7.0 - 25.0  20.5   eGFR >60.0 mL/min/1.73 70.4   Glucose 65 - 99 mg/dL 56 (L)   Calcium 8.6 - 10.5 mg/dL 9.5   (H): Data is abnormally high  (L): Data is abnormally low  History reviewed. No pertinent family history.  Social History     Socioeconomic History    Marital status: Single   Tobacco Use    Smoking status: Never    Smokeless tobacco: Never   Substance and Sexual Activity    Alcohol use: Yes     Comment: monthly     Drug use: No    Sexual activity: Defer     Past Medical History:   Diagnosis Date     Diabetes mellitus     PONV (postoperative nausea and vomiting)      Past Surgical History:   Procedure Laterality Date    COLONOSCOPY N/A 7/29/2019    Procedure: COLONOSCOPY TO CECUM;  Surgeon: Carmela Ng MD;  Location: [facility] ENDOSCOPY;  Service: General    REPLACEMENT TOTAL KNEE BILATERAL  2010, 2016       Current Outpatient Medications:     Accu-Chek FastClix Lancets misc, USE AS DIRECTED TO TEST BLOOD SUGAR 3-4 TIMES DAILY, Disp: , Rfl:     BD Pen Needle Mirian 2nd Gen 32G X 4 MM misc, , Disp: , Rfl:     Cholecalciferol (VITAMIN D3) 5000 units capsule capsule, Take 2,000 Units by mouth Daily. 2000 IUs, Disp: , Rfl:     coenzyme Q10 100 MG capsule, Take 1 capsule by mouth Daily., Disp: , Rfl:     Continuous Glucose Sensor (Dexcom G7 Sensor) misc, Inject 1 Units under the skin into the appropriate area as directed Every 10 (Ten) Days., Disp: 3 each, Rfl: 2    Insulin Glargine (BASAGLAR KWIKPEN) 100 UNIT/ML injection pen, Inject 17 Units under the skin into the appropriate area as directed Daily., Disp: , Rfl:     insulin lispro (humaLOG) 100 UNIT/ML injection, Inject 17 Units under the skin into the appropriate area as directed 3 (Three) Times a Day Before Meals., Disp: , Rfl:     levothyroxine (SYNTHROID, LEVOTHROID) 88 MCG tablet, Take 1 tablet by mouth Daily., Disp: , Rfl:     losartan (COZAAR) 25 MG tablet, Take 1 tablet by mouth Daily., Disp: , Rfl:     OneTouch Verio test strip, USE AS DIRECTED TO TEST BLOOD SUGAR THREE TIMES DAILY TO FOUR TIMES DAILY, Disp: , Rfl:     pravastatin (PRAVACHOL) 10 MG tablet, Take 1 tablet by mouth Daily., Disp: , Rfl:     vitamin B-12 (CYANOCOBALAMIN) 1000 MCG tablet, Take 1 tablet by mouth Daily., Disp: , Rfl:   Patient has no known allergies.    Objective   Vitals:    02/10/25 0906   BP: 124/72   Pulse: 53   Temp: 98.2 °F (36.8 °C)   SpO2: 99%          Physical Exam  Neurological:      General: No focal deficit present.      Mental Status: She is alert and oriented to  person, place, and time.               Diagnoses and all orders for this visit:    1. Type 2 diabetes mellitus without complication, with long-term current use of insulin (Primary)  -     Basic Metabolic Panel  -     Cancel: ZNT8 Antibodies; Future  -     C-Peptide  -     Glutamic Acid Decarboxylase  -     ZNT8 Antibodies; Future  -     Continuous Glucose Sensor (Dexcom G7 Sensor) misc; Inject 1 Units under the skin into the appropriate area as directed Every 10 (Ten) Days.  Dispense: 3 each; Refill: 2         Assessment:     1- DM, Type 2  Good control but having too many low BG which is a High risk   labs on file   We discussed the medications  Hypoglycemia and its importance was reviewed   Labs where shown to the patient   2. Hypertension, on ARB   3. Hyperlipidemia, on a statin   4. Obesity, We discussed the importance of weight loss and the impact of losing 7 % Of the current body weight         Plan:    1. Diet, exercise and weight management  2. Consistent food intake to avoid low blood sugars  3. Home medication includes for diabetes     Change to Lantus, pen 22 units PM   Humalog 6 , units per meal + SSI   1/50 above   First goal it avoid the lows   I told her that she is most likely type 2 not 1 , will test her today     4. Consistent checking of blood sugars using CGM   5. I reviewed the last progress note  6. Annual eye exam  7. Return in 3 months   8. Send in readings in 4-6 weeks if running high , she will send me a massage   9. Rx sent to the pharmacy  10. Labs : A1c today       I discussed with the patient/legal representative the risks and the benefits associated with the medications.  The patient/legal representative has been given the opportunity to ask questions.  Alternatives to the proposed treatment (s) were discussed, including the likely results of no treatment.  The patient/legal representative wishes to proceed.       Lisa Gonzalez MD   02/10/25  09:30 EST

## 2025-02-11 ENCOUNTER — PATIENT ROUNDING (BHMG ONLY) (OUTPATIENT)
Dept: ENDOCRINOLOGY | Age: 75
End: 2025-02-11
Payer: MEDICARE

## 2025-02-22 LAB
BUN SERPL-MCNC: 23 MG/DL (ref 8–27)
BUN/CREAT SERPL: 26 (ref 12–28)
C PEPTIDE SERPL-MCNC: 0.2 NG/ML (ref 1.1–4.4)
CALCIUM SERPL-MCNC: 9.4 MG/DL (ref 8.7–10.3)
CHLORIDE SERPL-SCNC: 107 MMOL/L (ref 96–106)
CO2 SERPL-SCNC: 26 MMOL/L (ref 20–29)
CREAT SERPL-MCNC: 0.9 MG/DL (ref 0.57–1)
EGFRCR SERPLBLD CKD-EPI 2021: 68 ML/MIN/1.73
GAD65 AB SER IA-ACNC: <5 U/ML (ref 0–5)
GLUCOSE SERPL-MCNC: 119 MG/DL (ref 70–99)
POTASSIUM SERPL-SCNC: 4.6 MMOL/L (ref 3.5–5.2)
SODIUM SERPL-SCNC: 143 MMOL/L (ref 134–144)
ZNT8 AB SERPL IA-ACNC: <15 U/ML

## 2025-03-11 ENCOUNTER — TELEPHONE (OUTPATIENT)
Dept: ENDOCRINOLOGY | Age: 75
End: 2025-03-11
Payer: MEDICARE

## 2025-03-11 NOTE — TELEPHONE ENCOUNTER
Pt dexcom report under media tab.    Pt called asking for you to look at her report and see if you think any changes need to be made.

## 2025-04-10 ENCOUNTER — TREATMENT (OUTPATIENT)
Dept: ENDOCRINOLOGY | Age: 75
End: 2025-04-10
Payer: MEDICARE

## 2025-04-28 DIAGNOSIS — E11.9 TYPE 2 DIABETES MELLITUS WITHOUT COMPLICATION, WITH LONG-TERM CURRENT USE OF INSULIN: Primary | ICD-10-CM

## 2025-04-28 DIAGNOSIS — Z79.4 TYPE 2 DIABETES MELLITUS WITHOUT COMPLICATION, WITH LONG-TERM CURRENT USE OF INSULIN: Primary | ICD-10-CM

## 2025-05-02 LAB
ALBUMIN/CREAT UR: 22 MG/G CREAT (ref 0–29)
CREAT UR-MCNC: 65.2 MG/DL
HBA1C MFR BLD: 7.1 % (ref 4.8–5.6)
MICROALBUMIN UR-MCNC: 14.5 UG/ML

## 2025-05-08 ENCOUNTER — OFFICE VISIT (OUTPATIENT)
Dept: ENDOCRINOLOGY | Age: 75
End: 2025-05-08
Payer: MEDICARE

## 2025-05-08 VITALS
OXYGEN SATURATION: 91 % | DIASTOLIC BLOOD PRESSURE: 72 MMHG | SYSTOLIC BLOOD PRESSURE: 122 MMHG | WEIGHT: 166.4 LBS | HEART RATE: 49 BPM | BODY MASS INDEX: 29.48 KG/M2 | TEMPERATURE: 97.9 F

## 2025-05-08 DIAGNOSIS — Z79.4 TYPE 2 DIABETES MELLITUS WITHOUT COMPLICATION, WITH LONG-TERM CURRENT USE OF INSULIN: ICD-10-CM

## 2025-05-08 DIAGNOSIS — E11.9 TYPE 2 DIABETES MELLITUS WITHOUT COMPLICATION, WITH LONG-TERM CURRENT USE OF INSULIN: ICD-10-CM

## 2025-05-08 RX ORDER — ACYCLOVIR 400 MG/1
1 TABLET ORAL
Qty: 3 EACH | Refills: 2 | Status: SHIPPED | OUTPATIENT
Start: 2025-05-08

## 2025-05-08 RX ORDER — ATORVASTATIN CALCIUM 10 MG/1
TABLET, FILM COATED ORAL
COMMUNITY
Start: 2025-03-28

## 2025-05-08 NOTE — PROGRESS NOTES
Chief complaint   Chief Complaint   Patient presents with    Type 2 diabetes mellitus without complication, with long-te          Subjective     History of Present Illness:          This is a 75 year old female I am seeing for type 2 DM   referred by PCP   Last OV was 3 months ago   She is here for a follow up   other medical history is   1- HTN   2- HLD   3- Hypothyroidism   4- Other   Pt had T2DM for years now about 10 years   Current home medication for diabetes     Lantus, pen 16 units PM   Humalog 6 , units per meal + SSI   Was on pills in the past     the A1c was 6.7 in 1-2025  the A1c was 7.1 in 5-2025  Checks blood sugar at home using CGM   Checks blood sugar 1-4 times per day   SBMG: ave is 156 with 10 % low and 58 % in range   pt states that he is having better BG now , but still has lows in the night and high BG after dinner trying his best with dietary Compliance, in regards to Exercise,  on a daily basis and his Weight has been the same and there is few  Hypoglycemic episodes,mainly at night  there is no AM Headaches /Nightmares, no Polyuria / Polydipsia, and there os no Blurring of Vision  Last Dilated Pupil Exam, in 2023 , no DM in the eye   NoTingling / numbness in feet, No Dizziness / lightheadedness, No Falls  No Nausea, vomiting / Diarrhea  retired.         Latest Reference Range & Units 10/31/23 13:12   Sodium 136 - 145 mmol/L 146 (H)   Potassium 3.5 - 5.2 mmol/L 4.0   Chloride 98 - 107 mmol/L 108 (H)   CO2 22.0 - 29.0 mmol/L 29.0   Anion Gap 5.0 - 15.0 mmol/L 9.0   BUN 8 - 23 mg/dL 18   Creatinine 0.57 - 1.00 mg/dL 0.88   BUN/Creatinine Ratio 7.0 - 25.0  20.5   eGFR >60.0 mL/min/1.73 70.4   Glucose 65 - 99 mg/dL 56 (L)   Calcium 8.6 - 10.5 mg/dL 9.5   (H): Data is abnormally high  (L): Data is abnormally low  History reviewed. No pertinent family history.  Social History     Socioeconomic History    Marital status: Single   Tobacco Use    Smoking status: Never    Smokeless tobacco: Never    Vaping Use    Vaping status: Never Used   Substance and Sexual Activity    Alcohol use: Yes     Comment: monthly     Drug use: No    Sexual activity: Defer     Past Medical History:   Diagnosis Date    Diabetes mellitus     PONV (postoperative nausea and vomiting)      Past Surgical History:   Procedure Laterality Date    COLONOSCOPY N/A 7/29/2019    Procedure: COLONOSCOPY TO CECUM;  Surgeon: Carmela Ng MD;  Location: [facility] ENDOSCOPY;  Service: General    REPLACEMENT TOTAL KNEE BILATERAL  2010, 2016       Current Outpatient Medications:     Accu-Chek FastClix Lancets misc, USE AS DIRECTED TO TEST BLOOD SUGAR 3-4 TIMES DAILY, Disp: , Rfl:     atorvastatin (LIPITOR) 10 MG tablet, , Disp: , Rfl:     BD Pen Needle Mirian 2nd Gen 32G X 4 MM misc, , Disp: , Rfl:     Cholecalciferol (VITAMIN D3) 5000 units capsule capsule, Take 2,000 Units by mouth Daily. 2000 IUs, Disp: , Rfl:     coenzyme Q10 100 MG capsule, Take 1 capsule by mouth Daily., Disp: , Rfl:     Continuous Glucose Sensor (Dexcom G7 Sensor) misc, Inject 1 Units under the skin into the appropriate area as directed Every 10 (Ten) Days., Disp: 3 each, Rfl: 2    Insulin Glargine (BASAGLAR KWIKPEN) 100 UNIT/ML injection pen, Inject 17 Units under the skin into the appropriate area as directed Daily., Disp: , Rfl:     insulin lispro (humaLOG) 100 UNIT/ML injection, Inject 17 Units under the skin into the appropriate area as directed 3 (Three) Times a Day Before Meals., Disp: , Rfl:     levothyroxine (SYNTHROID, LEVOTHROID) 88 MCG tablet, Take 1 tablet by mouth Daily., Disp: , Rfl:     losartan (COZAAR) 25 MG tablet, Take 1 tablet by mouth Daily., Disp: , Rfl:     OneTouch Verio test strip, USE AS DIRECTED TO TEST BLOOD SUGAR THREE TIMES DAILY TO FOUR TIMES DAILY, Disp: , Rfl:     pravastatin (PRAVACHOL) 10 MG tablet, Take 1 tablet by mouth Daily., Disp: , Rfl:     vitamin B-12 (CYANOCOBALAMIN) 1000 MCG tablet, Take 1 tablet by mouth Daily., Disp: , Rfl:    Patient has no known allergies.    Objective   Vitals:    05/08/25 0906   BP: 122/72   Pulse: (!) 49   Temp: 97.9 °F (36.6 °C)   SpO2: 91%          Physical Exam  Neurological:      General: No focal deficit present.      Mental Status: She is alert and oriented to person, place, and time.               There are no diagnoses linked to this encounter.       Assessment:     1- DM, Type 2  Good control but having too many low BG which is a High risk   labs on file   We discussed the medications  Hypoglycemia and its importance was reviewed   Labs where shown to the patient   2. Hypertension, on ARB   3. Hyperlipidemia, on a statin   4. Obesity, We discussed the importance of weight loss and the impact of losing 7 % Of the current body weight         Plan:    1. Diet, exercise and weight management  2. Consistent food intake to avoid low blood sugars  3. Home medication includes for diabetes     Change to Lantus, pen 15 units AM , No PM doses   Humalog 6-6-8 , units per meal + SSI   1/50 above   First goal it avoid the lows   I told her that she has a low C peptide and is in need of insulin     4. Consistent checking of blood sugars using CGM   5. I reviewed the last progress note  6. Annual eye exam  7. Return in 3 months   8. Send in readings in 4-6 weeks if running high , she will send me a massage   9. Rx sent to the pharmacy  10. Labs : A1c every 90 days       I discussed with the patient/legal representative the risks and the benefits associated with the medications.  The patient/legal representative has been given the opportunity to ask questions.  Alternatives to the proposed treatment (s) were discussed, including the likely results of no treatment.  The patient/legal representative wishes to proceed.       Lisa Gonzalez MD   05/08/25  09:23 EDT

## 2025-05-28 ENCOUNTER — TREATMENT (OUTPATIENT)
Dept: ENDOCRINOLOGY | Age: 75
End: 2025-05-28
Payer: MEDICARE

## 2025-06-09 ENCOUNTER — TREATMENT (OUTPATIENT)
Dept: ENDOCRINOLOGY | Age: 75
End: 2025-06-09
Payer: MEDICARE

## 2025-07-07 ENCOUNTER — TREATMENT (OUTPATIENT)
Dept: ENDOCRINOLOGY | Age: 75
End: 2025-07-07
Payer: MEDICARE

## 2025-07-18 ENCOUNTER — TELEPHONE (OUTPATIENT)
Dept: ENDOCRINOLOGY | Age: 75
End: 2025-07-18

## 2025-07-21 DIAGNOSIS — E11.9 TYPE 2 DIABETES MELLITUS WITHOUT COMPLICATION, WITH LONG-TERM CURRENT USE OF INSULIN: Primary | ICD-10-CM

## 2025-07-21 DIAGNOSIS — Z79.4 TYPE 2 DIABETES MELLITUS WITHOUT COMPLICATION, WITH LONG-TERM CURRENT USE OF INSULIN: Primary | ICD-10-CM

## 2025-08-06 ENCOUNTER — LAB (OUTPATIENT)
Facility: HOSPITAL | Age: 75
End: 2025-08-06
Payer: MEDICARE

## 2025-08-06 LAB
CHOLEST SERPL-MCNC: 154 MG/DL (ref 0–200)
HBA1C MFR BLD: 6.5 % (ref 4.8–5.6)
HDLC SERPL-MCNC: 63 MG/DL (ref 40–60)
LDLC SERPL CALC-MCNC: 80 MG/DL (ref 0–100)
LDLC/HDLC SERPL: 1.27 {RATIO}
TRIGL SERPL-MCNC: 55 MG/DL (ref 0–150)
VLDLC SERPL-MCNC: 11 MG/DL (ref 5–40)

## 2025-08-06 PROCEDURE — 83036 HEMOGLOBIN GLYCOSYLATED A1C: CPT | Performed by: INTERNAL MEDICINE

## 2025-08-06 PROCEDURE — 80061 LIPID PANEL: CPT | Performed by: INTERNAL MEDICINE

## 2025-08-11 ENCOUNTER — OFFICE VISIT (OUTPATIENT)
Dept: ENDOCRINOLOGY | Age: 75
End: 2025-08-11
Payer: MEDICARE

## 2025-08-11 VITALS
HEIGHT: 64 IN | WEIGHT: 165.4 LBS | HEART RATE: 59 BPM | OXYGEN SATURATION: 98 % | BODY MASS INDEX: 28.24 KG/M2 | DIASTOLIC BLOOD PRESSURE: 74 MMHG | SYSTOLIC BLOOD PRESSURE: 118 MMHG

## 2025-08-11 DIAGNOSIS — Z79.4 TYPE 2 DIABETES MELLITUS WITHOUT COMPLICATION, WITH LONG-TERM CURRENT USE OF INSULIN: ICD-10-CM

## 2025-08-11 DIAGNOSIS — E11.9 TYPE 2 DIABETES MELLITUS WITHOUT COMPLICATION, WITH LONG-TERM CURRENT USE OF INSULIN: ICD-10-CM

## 2025-08-11 RX ORDER — ACYCLOVIR 400 MG/1
1 TABLET ORAL
Qty: 3 EACH | Refills: 2 | Status: SHIPPED | OUTPATIENT
Start: 2025-08-11

## 2025-08-11 RX ORDER — IBUPROFEN 800 MG/1
TABLET, FILM COATED ORAL
COMMUNITY
Start: 2025-05-27

## (undated) DEVICE — THE TORRENT IRRIGATION SCOPE CONNECTOR IS USED WITH THE TORRENT IRRIGATION TUBING TO PROVIDE IRRIGATION FLUIDS SUCH AS STERILE WATER DURING GASTROINTESTINAL ENDOSCOPIC PROCEDURES WHEN USED IN CONJUNCTION WITH AN IRRIGATION PUMP (OR ELECTROSURGICAL UNIT).: Brand: TORRENT

## (undated) DEVICE — Device: Brand: DEFENDO AIR/WATER/SUCTION AND BIOPSY VALVE

## (undated) DEVICE — CANN NASL CO2 TRULINK W/O2 A/

## (undated) DEVICE — TUBING, SUCTION, 1/4" X 10', STRAIGHT: Brand: MEDLINE

## (undated) DEVICE — SENSR O2 OXIMAX FNGR A/ 18IN NONSTR